# Patient Record
Sex: MALE | Race: ASIAN | NOT HISPANIC OR LATINO
[De-identification: names, ages, dates, MRNs, and addresses within clinical notes are randomized per-mention and may not be internally consistent; named-entity substitution may affect disease eponyms.]

---

## 2017-05-01 ENCOUNTER — RECORDS - HEALTHEAST (OUTPATIENT)
Dept: ADMINISTRATIVE | Facility: OTHER | Age: 62
End: 2017-05-01

## 2017-05-10 ENCOUNTER — OFFICE VISIT - HEALTHEAST (OUTPATIENT)
Dept: FAMILY MEDICINE | Facility: CLINIC | Age: 62
End: 2017-05-10

## 2017-05-10 DIAGNOSIS — I63.9 CVA (CEREBRAL VASCULAR ACCIDENT) (H): ICD-10-CM

## 2017-05-10 DIAGNOSIS — R79.89 ELEVATED SERUM CREATININE: ICD-10-CM

## 2017-05-10 DIAGNOSIS — H02.401 PTOSIS, RIGHT: ICD-10-CM

## 2017-05-10 DIAGNOSIS — M10.9 GOUT: ICD-10-CM

## 2017-05-10 DIAGNOSIS — I69.351 HEMIPARESIS AFFECTING RIGHT SIDE AS LATE EFFECT OF CEREBROVASCULAR ACCIDENT (CVA) (H): ICD-10-CM

## 2017-05-10 DIAGNOSIS — I16.0 HYPERTENSIVE URGENCY: ICD-10-CM

## 2017-05-10 DIAGNOSIS — H53.8 BLURRY VISION: ICD-10-CM

## 2017-05-10 ASSESSMENT — MIFFLIN-ST. JEOR: SCORE: 1461.45

## 2017-05-11 RX ORDER — CARVEDILOL 6.25 MG/1
6.25 TABLET ORAL 2 TIMES DAILY
Qty: 60 TABLET | Refills: 1 | Status: SHIPPED | OUTPATIENT
Start: 2017-05-11

## 2017-05-11 RX ORDER — AMLODIPINE BESYLATE 10 MG/1
10 TABLET ORAL DAILY
Qty: 30 TABLET | Refills: 1 | Status: SHIPPED | OUTPATIENT
Start: 2017-05-11

## 2017-05-17 ENCOUNTER — OFFICE VISIT - HEALTHEAST (OUTPATIENT)
Dept: PHYSICAL THERAPY | Facility: CLINIC | Age: 62
End: 2017-05-17

## 2017-05-17 DIAGNOSIS — R29.898 RIGHT LEG WEAKNESS: ICD-10-CM

## 2017-05-17 DIAGNOSIS — R26.9 ABNORMALITY OF GAIT: ICD-10-CM

## 2017-05-17 DIAGNOSIS — I69.351 HEMIPARESIS AFFECTING RIGHT SIDE AS LATE EFFECT OF CEREBROVASCULAR ACCIDENT (CVA) (H): ICD-10-CM

## 2017-05-17 DIAGNOSIS — R29.818 DIFFICULTY BALANCING: ICD-10-CM

## 2017-05-17 DIAGNOSIS — R29.898 RIGHT ARM WEAKNESS: ICD-10-CM

## 2017-06-19 ENCOUNTER — RECORDS - HEALTHEAST (OUTPATIENT)
Dept: ADMINISTRATIVE | Facility: OTHER | Age: 62
End: 2017-06-19

## 2017-07-06 ENCOUNTER — RECORDS - HEALTHEAST (OUTPATIENT)
Dept: ADMINISTRATIVE | Facility: OTHER | Age: 62
End: 2017-07-06

## 2017-08-02 ENCOUNTER — OFFICE VISIT - HEALTHEAST (OUTPATIENT)
Dept: FAMILY MEDICINE | Facility: CLINIC | Age: 62
End: 2017-08-02

## 2017-08-02 DIAGNOSIS — Z01.818 PREOP EXAMINATION: ICD-10-CM

## 2017-08-02 DIAGNOSIS — I51.7 LVH (LEFT VENTRICULAR HYPERTROPHY): ICD-10-CM

## 2017-08-02 DIAGNOSIS — R94.31 ABNORMAL EKG: ICD-10-CM

## 2017-08-02 DIAGNOSIS — I16.0 HYPERTENSIVE URGENCY: ICD-10-CM

## 2017-08-02 DIAGNOSIS — Z91.199 MEDICALLY NONCOMPLIANT: ICD-10-CM

## 2017-08-02 DIAGNOSIS — I12.9 HYPERTENSIVE NEPHROSCLEROSIS: ICD-10-CM

## 2017-08-02 DIAGNOSIS — H02.409 PTOSIS: ICD-10-CM

## 2017-08-02 DIAGNOSIS — I69.351 HEMIPARESIS AFFECTING RIGHT SIDE AS LATE EFFECT OF CEREBROVASCULAR ACCIDENT (CVA) (H): ICD-10-CM

## 2017-08-02 ASSESSMENT — MIFFLIN-ST. JEOR: SCORE: 1501.59

## 2017-08-03 LAB
ATRIAL RATE - MUSE: 55 BPM
DIASTOLIC BLOOD PRESSURE - MUSE: NORMAL MMHG
INTERPRETATION ECG - MUSE: NORMAL
P AXIS - MUSE: 28 DEGREES
PR INTERVAL - MUSE: 194 MS
QRS DURATION - MUSE: 72 MS
QT - MUSE: 434 MS
QTC - MUSE: 415 MS
R AXIS - MUSE: -2 DEGREES
SYSTOLIC BLOOD PRESSURE - MUSE: NORMAL MMHG
T AXIS - MUSE: -23 DEGREES
VENTRICULAR RATE- MUSE: 55 BPM

## 2017-08-09 ENCOUNTER — OFFICE VISIT - HEALTHEAST (OUTPATIENT)
Dept: CARDIOLOGY | Facility: CLINIC | Age: 62
End: 2017-08-09

## 2017-08-09 DIAGNOSIS — R94.31 ABNORMAL ECG: ICD-10-CM

## 2017-08-09 DIAGNOSIS — I12.9 HYPERTENSIVE NEPHROSCLEROSIS: ICD-10-CM

## 2017-08-09 DIAGNOSIS — I16.0 HYPERTENSIVE URGENCY: ICD-10-CM

## 2017-08-09 ASSESSMENT — MIFFLIN-ST. JEOR: SCORE: 1521.55

## 2017-09-27 ENCOUNTER — RECORDS - HEALTHEAST (OUTPATIENT)
Dept: ADMINISTRATIVE | Facility: OTHER | Age: 62
End: 2017-09-27

## 2021-05-30 VITALS — BODY MASS INDEX: 34.17 KG/M2 | WEIGHT: 181 LBS | HEIGHT: 61 IN

## 2021-05-31 VITALS — WEIGHT: 177.6 LBS | HEIGHT: 64 IN | BODY MASS INDEX: 30.32 KG/M2

## 2021-05-31 VITALS — WEIGHT: 182 LBS | HEIGHT: 64 IN | BODY MASS INDEX: 31.07 KG/M2

## 2021-06-10 NOTE — PROGRESS NOTES
[facility] Certification Request    May 17, 2017      Patient: Zoltan Montoya  MR Number: [id number]  YOB: 1955  Date of Visit: 5/17/2017      Dear Dr. Mcbride    Thank you for this referral.   We are seeing Zoltan Montoya for Physical Therapy of Hemiparesis affecting right side as late effect of cerebrovascular accident (CVA).    Medicare and/or Medicaid requires physician review and approval of the treatment plan. Please review the plan of care and verify that you agree with the therapy plan of care by co-signing this note.      Plan of Care  Authorization / Certification Start Date: 05/17/17  Authorization / Certification End Date: 07/16/17  Authorization / Certification Number of Visits: MA  Communication with: Referral Source  Patient Related Instruction: Nature of Condition;Treatment plan and rationale;Self Care instruction;Basis of treatment;Next steps  Times per Week: 1-2  Number of Visits: up to 12  Discharge Planning: home program  Therapeutic Exercise: Stretching;Strengthening  Neuromuscular Reeducation: balance/proprioception  Manual Therapy: other  Manual Therapy: as needed    Goals:  Pt. will demonstrate/verbalize independence in self-management of condition in : 12 weeks  Pt. will be independent with home exercise program in : 12 weeks  Pt. will be able to walk : 60 minutes;with less difficulty;in 12 weeks;Comment  Comment:: wiht minimal fatigue  Patient will reach / maintain arm movement: forward;overhead;for dressing;for home chores;with less difficulty;in 12 weeks  Patient will be able to: gripping;holding;for housework;for meal preparation;for grooming/hygiene;with less difficulty;in 12 weeks      If you have any questions or concerns, please don't hesitate to call.    Sincerely,      Zuleyka Pugh, PT        Physician recommendation:     ___ Follow therapist's recommendation        ___ Modify therapy      *Physician co-signature indicates they certify the need for these  services furnished within this plan and while under their care.      [facility]   Initial Evaluation    Patient Name: Zoltan Montoya  Date of evaluation: 5/17/2017   Visit #1  Referral Diagnosis: Hemiparesis affecting right side as late effect of cerebrovascular accident (CVA)  Referring provider: Valerie Mcbride DO  Visit Diagnosis:     ICD-10-CM    1. Right arm weakness M62.81    2. Right leg weakness M62.81    3. Difficulty balancing R29.818    4. Abnormality of gait R26.9    5. Hemiparesis affecting right side as late effect of cerebrovascular accident (CVA) I69.351        Assessment:      Pt. is appropriate for skilled PT intervention as outlined in the Plan of Care (POC).  Pt. is a good candidate for skilled PT services to improve pain levels and function.    Goals:  Pt. will demonstrate/verbalize independence in self-management of condition in : 12 weeks  Pt. will be independent with home exercise program in : 12 weeks  Pt. will be able to walk : 60 minutes;with less difficulty;in 12 weeks;Comment  Comment:: wiht minimal fatigue  Patient will reach / maintain arm movement: forward;overhead;for dressing;for home chores;with less difficulty;in 12 weeks  Patient will be able to: gripping;holding;for housework;for meal preparation;for grooming/hygiene;with less difficulty;in 12 weeks    Patient's expectations/goals are realistic.    Barriers to Learning or Achieving Goals:  No Barriers.       Plan / Patient Instructions:        Plan of Care:   Authorization / Certification Start Date: 05/17/17  Authorization / Certification End Date: 07/16/17  Authorization / Certification Number of Visits: MA  Communication with: Referral Source  Patient Related Instruction: Nature of Condition;Treatment plan and rationale;Self Care instruction;Basis of treatment;Next steps  Times per Week: 1-2  Number of Visits: up to 12  Discharge Planning: home program  Therapeutic Exercise: Stretching;Strengthening  Neuromuscular  Reeducation: balance/proprioception  Manual Therapy: other  Manual Therapy: as needed    Plan for next visit: progression of home program, balance/gait training     Subjective:         Social information:   Living Situation:lives with others    Occupation:retired   Work Status:NA   Equipment Available: None    History of Present Illness:    Zoltan is a 61 y.o. male who presents to therapy today with complaints of (R) sided stiffness, weakness in arm and leg. Denies any pain complaints. Hx of CVA in . Recent episode of sx including (R) sided stiffness related to HTN. Date of onset/duration of symptoms is . Onset was sudden. Symptoms are constant. He reports  A previous  history of similar symptoms. He describes their previous level of function as not limited    Pain Ratin}  Pain rating at best: 1  Pain rating at worst: 3  Pain description: weakness and stiffness    Functional limitations are described as occurring with:   ascending and descending stairs or curbs  balance  gripping and holding  lifting  personal cares dressing  performing routine daily activities  sitting 20 min   standing 30 min  walking 30 min  lifting   writing    Patient reports benefit from:  movement or exercise        Objective:      Patient Outcome Measures :    Lower Extremity Functional Scale (_/80): 26   Scores range from 0-80, where a score of 80 represents maximum function. The minimal clinically important difference is a positive change of 9 points.    Examination  1. Right arm weakness     2. Right leg weakness     3. Difficulty balancing     4. Abnormality of gait     5. Hemiparesis affecting right side as late effect of cerebrovascular accident (CVA)       Involved side: Right  Posture Observation: NA    Gait: (L) lat trunk sway, higher knee lift (R) with decreased (R) ankle ROM, asymmetrical arm swing, normal umair, symmetrical step length.   SLS: 5 sec of less (B), expresses fear of falling.     (B) shoulder AROM is WNL  in flexion, abd, IR/ext    Strength  Date: 5/17/17     /5 Right Left Right Left Right Left   Cervical Flexion (C1-2)         Cervical Sidebending (C3)         Shoulder flexion 5 5       Shoulder Abduction (C5) 5 5       Elbow Flexion (C6) 5 5       Elbow Extension (C7) 5 5       Wrist Flexion (C7) 5 5       Wrist Extension (C6)         Thumb abduction (C8)          strength  35/40/45# 55/55/50#         (B) hip, knee and ankle ROM is WFL except for mild hip rotational tightness.     Lower Extremity Strength:  Date: 5/17/17     LE strength/5 Right Left Right Left Right Left   Hip Flexion (L1-3) 4 4+       Hip Extension (L5-S1)         Hip Abduction (L4-5)         Hip Adduction (L2-3)         Hip External Rotation         Hip Internal Rotation         Knee Extension (L3-4) 4+ 4+       Knee Flexion 5- 5-       Ankle Dorsiflexion (L4-5) 5- 5       Great Toe Extension (L5)         Ankle Plantar flexion (S1) 3+ 3+       Abdominals            Treatment Today   Paper work 20 min  TREATMENT MINUTES COMMENTS   Evaluation 25    Self-care/ Home management     Manual therapy     Neuromuscular Re-education     Therapeutic Activity     Therapeutic Exercises 20 Discussed findings, plan of care, instructed in exercises.Issued handouts and band.    Gait training     Modality__________________                Total 45    Blank areas are intentional and mean the treatment did not include these items.     PT Evaluation Code: (Please list factors)  Patient History/Comorbidities: 2  Examination: 4  Clinical Presentation: evolving  Clinical Decision Making: mod    Patient History/  Comorbidities Examination  (body structures and functions, activity limitations, and/or participation restrictions) Clinical Presentation Clinical Decision Making (Complexity)   No documented Comorbidities or personal factors 1-2 Elements Stable and/or uncomplicated Low   1-2 documented comorbidities or personal factor 3 Elements Evolving clinical presentation  with changing characteristics Moderate   3-4 documented comorbidities or personal factors 4 or more Unstable and unpredictable High              Zuleyka Pugh  5/17/2017  9:10 AM    [facility] Discharge Summary  Patient Name: Zoltan Montoya  Date: 6/21/2017  Referral Diagnosis: Hemiparesis affecting right side as late effect of cerebrovascular accident (CVA)  Referring provider: Valerie Mcbride DO  Visit Diagnosis:   1. Right arm weakness     2. Right leg weakness     3. Difficulty balancing     4. Abnormality of gait     5. Hemiparesis affecting right side as late effect of cerebrovascular accident (CVA)         Goals:  Pt. will demonstrate/verbalize independence in self-management of condition in : 12 weeks  Pt. will be independent with home exercise program in : 12 weeks  Pt. will be able to walk : 60 minutes;with less difficulty;in 12 weeks;Comment  Comment:: wiht minimal fatigue  Patient will reach / maintain arm movement: forward;overhead;for dressing;for home chores;with less difficulty;in 12 weeks  Patient will be able to: gripping;holding;for housework;for meal preparation;for grooming/hygiene;with less difficulty;in 12 weeks    Patient was seen for 1 visit on 5/17/17 with no missed appointments.  Patient did not schedule follow up appointments.  He was instructed in a a home program.  His current status is not known.     Therapy will be discontinued at this time.  The patient will need a new referral to resume.    Thank you for your referral.  Zuleyka Pugh  6/21/2017  9:04 AM

## 2021-06-10 NOTE — PROGRESS NOTES
Assessment/Plan:     1. Hypertensive urgency  Currently well controlled plan to continue medications.  They will continue to monitor blood pressure at home.    2. Elevated serum creatinine  Patient does have follow-up with nephrologist will get lab recheck  - Basic Metabolic Panel    3. CVA (cerebral vascular accident)  4. Hemiparesis affecting right side as late effect of cerebrovascular accident (CVA)  Currently off of anticoagulants as recommended in the hospital due to subacute hemorrhage.  May need to consider anticoagulants in the future.  Patient's right-sided weakness is reported at the chronic state.  Occupational therapy per his request.  Discussed the importance of staying on medication to control risk factors to prevent further strokes.  Patient and daughter-in-law are and agree movement continuing medications and medical care at this time  - Ambulatory referral to PT/OT    5. Blurry vision  Chronic will refer to ophthalmology  - Ambulatory referral to Ophthalmology    6. Ptosis, right  Referral as below  - Ambulatory referral to Ophthalmology    7. Gout  Seems resolved and patient's left cyst prednisone tap was today.  Suspect that some of the weight gain that they are concerned about will decrease with the cessation of prednisone but they will continue to monitor and let us know if symptoms are not improving in the next week    Plan to follow-up with Dr. Gill within the next few weeks to establish care with him per patient's preference.    Subjective:      Zoltan Montoya is a 61 y.o. male comes in today with  and daughter-in-law to establish care at our office.  He does plan to establish care with Dr. Gill as he prefers male provider.  He recently moved here to be with his family from [place].  He ended up in the hospital at [facility] just a few days after arrival in [place] due to complaints of right-sided weakness and blurry vision.  He was found to be in hypertensive urgency.   Reviewed the hospitalization from May 3 until May 5.  Reviewed discharge summary labs imaging that were done.  Reviewed the medical tests including EKG and echo that were done.  She was found to have a subacute hemorrhage and infarct on MRI.  His blood pressure was able to be regulated with medications.  He tells me that his right-sided weakness and blurry vision has not improved since hospitalization but that it is in fact chronic.  She reported that he had a stroke in 2015 and these are late effects from that stroke.  He states that he would like to try therapy to see if his weakness can improve.  He has no pain.  During hospitalization he was also found to have a living and elevated creatinine.  It did improve somewhat over the course of hospitalization.  He does have follow-up in a few weeks with nephrology.  He was also found to have gout in his right toe was started on prednisone took last pill today and has improved.  We performed medication reconciliation he was taking all medications as prescribed tolerates them well.  They have been checking his blood pressure and pulse.  Pulse has been in the low 60s blood pressure has been very well controlled.  They are complaining of some weight gain almost 10 pounds since discharge.  He has no chest pain pressure shortness of breath or palpitations.  His echocardiogram was normal.  No concerns with bowel or bladder.  We did try to update past medical surgical social family history but overall patient's history is somewhat lacking.  It is reported that he had a stroke in 2015 but states he has not recently seen medical care.  He does not know the reason for his stroke.  He was taking no medications prior to coming to [place].  He states he is not known to have any kidney damage but again has not been checked for several years.  No other new concerns today    Current Outpatient Prescriptions   Medication Sig Dispense Refill     amLODIPine (NORVASC) 10 MG tablet Take 1  "tablet (10 mg total) by mouth daily. 30 tablet 0     carvedilol (COREG) 6.25 MG tablet Take 1 tablet (6.25 mg total) by mouth 2 (two) times a day. 60 tablet 0     No current facility-administered medications for this visit.        Past Medical History, Family History, and Social History reviewed.  Past Medical History:   Diagnosis Date     HTN (hypertension)      Stroke 2015     Past Surgical History:   Procedure Laterality Date     APPENDECTOMY       Review of patient's allergies indicates no known allergies.  History reviewed. No pertinent family history.  Social History     Social History     Marital status:      Spouse name: N/A     Number of children: N/A     Years of education: N/A     Occupational History     Not on file.     Social History Main Topics     Smoking status: Never Smoker     Smokeless tobacco: Not on file     Alcohol use No     Drug use: No     Sexual activity: Not on file     Other Topics Concern     Not on file     Social History Narrative         Review of systems is as stated in HPI, and the remainder of the 10 system review is otherwise negative.    Objective:     Vitals:    05/10/17 1440   BP: 138/78   Patient Site: Right Arm   Patient Position: Sitting   Cuff Size: Adult Large   Pulse: 60   Weight: 181 lb (82.1 kg)   Height: 5' 0.5\" (1.537 m)    Body mass index is 34.77 kg/(m^2).    General Appearance:    Alert, cooperative, no distress, appears stated age   Head:    Normocephalic, without obvious abnormality, atraumatic   Eyes:   there is left lid ptosis, PERRL, EOM's intact   Ears:    Normal TM's and external ear canals   Nose:   Mucosa normal, no drainage     or sinus tenderness   Throat:   Oropharynx is clear   Neck:   Supple, symmetrical, no adenopathy, no thyromegally, no carotid bruit        Lungs:     Clear to auscultation bilaterally, respirations unlabored   Chest Wall:    No tenderness or deformity    Heart:    Regular rate and rhythm, S1 and S2 normal, no murmur, rub   "  or gallop       Abdomen:     Soft, non-tender, bowel sounds active all four quadrants,     no masses, no organomegaly           Extremities      Neuro:  very mild decrease in strength on right extremity compared to left otherwise extremities normal, atraumatic, no cyanosis, trace edema to mid shin, ambulates normally   Normal sensation and reflexes in extremities.  Cranial nerves grossly intact.  No dysdiadochokinesia    Pulses:   2+ and symmetric all extremities   Skin:   No rashes or lesions         This note has been dictated using voice recognition software. Any grammatical or context distortions are unintentional and inherent to the the software.

## 2021-06-12 NOTE — PROGRESS NOTES
Assessment/Plan:     1. Preop examination  2. Ptosis  3. Hypertensive urgency  4. Hemiparesis affecting right side as late effect of cerebrovascular accident (CVA)  5. Hypertensive nephrosclerosis  6. LVH (left ventricular hypertrophy)  7. Abnormal EKG-I personally reviewed EKG showing some bradycardia with some worrisome ST elevations compared to previous.  Pending radiologist's final interpretation  8. Medically noncompliant  I spent significant time discussing with patient through the  and also with the daughter-in-law the short and long-term consequences of his uncontrolled hypertension.  I believe him to be at very strong risk for both stroke and heart attack especially due to new EKG changes.  Did discuss with patient that further strokes and heart disease could leave him with further disability difficulty using his extremities, difficulty with speech or swallowing memory or even death.  Patient states at this point he is not willing to restart his medication.  He does not want to get a second opinion by a neurologist.  He is willing to get some workup with his heart but is not willing to go to emergency room or hospital today.  Currently asymptomatic in office.  We will add referral to cardiology rapid access clinic and discussed with patient and family that if he has any symptoms such as chest pain pressure palpitations difficulty breathing dizziness swelling in extremities or any other symptoms he should urgently be seen at the emergency room.  Again strongly advised that patient restart medications.  Even gave option to change medications to something else he may be more comfortable with he declines.  I do believe that patient does understand the gravity of the situation.  Also daughter-in-law does understand the gravity of the situation but states that family has decided that it will be patient's choice.  They continue to urge him to be medically compliant.  At this point I cannot recommend  elective surgery.  Patient states he is agreeable and states he does not feel he necessarily needs his eyelid surgery at this point.  We will notify the surgeon's office.  - Ambulatory referral to [facility]  -Referral for stress test          Total time spent was 45 minutes, >50% spent discussing treatment options noted above, counseling and coordination of care.     Subjective:      Zoltan Montoya is a 61 y.o. male comes in today initially for preoperative evaluation.  He is seen today with .  Daughter-in-law Aide is also here.    Scheduled Procedure: left eye surgery   Surgery Date:  8/18/17  Surgery Location:  [facility] [place] fax-  [phone]  Surgeon: Dr Pickett    Patient plans for ptosis surgery left eyelid.  This is reportedly from past stroke.  Last time I saw him a few months ago was just after hypertensive urgency and stroke.  He was doing well on his medications.  Since we last saw him he has stopped both his carvedilol and amlodipine.  He states he did not particularly have any problems or reactions or concerns with these medications he just does not want to take medications.  I was also able to get the records from nephrology who did see him and also strongly suggested continuing medications due to some kidney injury.  Patient states that overall he feels well he has no chest pain palpitations diaphoresis or shortness of breath.  He has no swelling in extremities.  He states he does still have some weakness in his arm went to physical therapy once may consider going back.  Reviewed the workup that was done in the hospital including echocardiogram showing some left ventricular hypertrophy but was otherwise normal.  Daughter-in-law states that she has been checking his blood pressure and it is typically quite elevated around 250 or so.  Daughter-in-law states that they have been urging him to take his medications but he is refusing.  Did have a very lengthy discussion with patient  "through  and also with daughter-in-law discussing both short and long-term consequences of his uncontrolled hypertension.    Current Outpatient Prescriptions   Medication Sig Dispense Refill     amLODIPine (NORVASC) 10 MG tablet Take 1 tablet (10 mg total) by mouth daily. 30 tablet 1     carvedilol (COREG) 6.25 MG tablet Take 1 tablet (6.25 mg total) by mouth 2 (two) times a day. 60 tablet 1     No current facility-administered medications for this visit.      No Known Allergies  Past Medical History, Family History, and Social History reviewed.  Past Medical History:   Diagnosis Date     HTN (hypertension)      Stroke 2015     Past Surgical History:   Procedure Laterality Date     APPENDECTOMY       Review of patient's allergies indicates no known allergies.  No family history on file.  Social History     Social History     Marital status:      Spouse name: N/A     Number of children: N/A     Years of education: N/A     Occupational History     Not on file.     Social History Main Topics     Smoking status: Never Smoker     Smokeless tobacco: Not on file     Alcohol use No     Drug use: No     Sexual activity: Not on file     Other Topics Concern     Not on file     Social History Narrative         Review of systems is as stated in HPI, and the remainder of the 10 system review is otherwise negative.    Objective:     Vitals:    08/02/17 0850   BP: (!) 240/100   Patient Site: Right Arm   Patient Position: Sitting   Cuff Size: Adult Large   Pulse: 66   SpO2: 98%   Weight: 177 lb 9.6 oz (80.6 kg)   Height: 5' 4\" (1.626 m)    Body mass index is 30.48 kg/(m^2).  Wt Readings from Last 3 Encounters:   08/02/17 177 lb 9.6 oz (80.6 kg)   05/10/17 181 lb (82.1 kg)   05/05/17 172 lb 1.6 oz (78.1 kg)       General Appearance:    Alert, cooperative, no distress, appears stated age    Head:    Normocephalic, without obvious abnormality, atraumatic   Eyes:    PERRL,  no conjunctivitis    Ears:    Normal " external ear canals   Nose:   Mucosa normal, no drainage       Throat:   Oropharynx is clear   Neck:   Supple, symmetrical, no adenopathy, no thyromegally, no carotid bruit        Lungs:     Clear to auscultation bilaterally, respirations unlabored   Chest Wall:    No tenderness or deformity    Heart:    Regular rate and rhythm, S1 and S2 normal, murmur, no rub    or gallop                   Extremities:  Chronic upper extremity weakness from past CVA otherwise extremities normal, atraumatic, no cyanosis or edema   Pulses:   2+ and symmetric all extremities   Neuro:   Minor left eyelid droop due to past CVA otherwise cranial nerves grossly intact, grossly normal sensation and reflexes in extremities    Psych:   grossly normal mood and affect without acute anxiety or psychosis    Skin:   No rashes or lesions   :          This note has been dictated using voice recognition software. Any grammatical or context distortions are unintentional and inherent to the software.

## 2021-06-15 PROBLEM — H02.401 PTOSIS, RIGHT: Status: ACTIVE | Noted: 2017-05-10

## 2021-06-15 PROBLEM — I16.0 HYPERTENSIVE URGENCY: Status: ACTIVE | Noted: 2017-05-03

## 2021-06-15 PROBLEM — I69.351 HEMIPARESIS AFFECTING RIGHT SIDE AS LATE EFFECT OF CEREBROVASCULAR ACCIDENT (CVA) (H): Status: ACTIVE | Noted: 2017-05-10

## 2021-06-15 PROBLEM — I63.9 CVA (CEREBRAL VASCULAR ACCIDENT) (H): Status: ACTIVE | Noted: 2017-05-10

## 2021-06-15 PROBLEM — M10.9 GOUT: Status: ACTIVE | Noted: 2017-05-10

## 2021-06-16 PROBLEM — I12.9 HYPERTENSIVE NEPHROSCLEROSIS: Status: ACTIVE | Noted: 2017-08-02

## 2021-06-25 NOTE — PROGRESS NOTES
Progress Notes by Cari Rodney MD at 8/9/2017  3:50 PM     Author: Cari Rodney MD Service: -- Author Type: Physician    Filed: 8/9/2017  5:18 PM Encounter Date: 8/9/2017 Status: Signed    : Cari Rodney MD (Physician)           Click to link to [facility] HEART CARE NOTE    Thank you, Dr. Mcbride, for asking the [facility] team to see Mr. Zoltan Montoya in the rapid access clinic to evaluate abnormal ECG.    Assessment/Recommendations   Assessment:    1.  Abnormal ECG.  I suspect this reflects left ventricular hypertrophy with mild early repolarization changes.  Because of his risk factors, he has been set up for a nuclear stress study this coming Monday which I concur with.  Unfortunately, the patient tells me that he was unaware of the stress study and may not show up for it.  I did strongly encourage him to follow through with the stress test.  2.  Hypertensive urgency.  Patient discontinued medications that were prescribed at the time of his hospitalization 1-2 months ago, telling me that he is concerned they will cause further harm to his kidneys.  I told him he was seen by a kidney specialist during that hospitalization who recommended these medications because they would not adversely affect his kidney function.  He continues to not want to resume those medications and I explained to him that he could develop further kidney failure as well as subsequent stroke or heart failure.  I also told him they would make him more disabled.  Despite all this, it appears that the patient is uninterested in taking care of himself.  This is confirmed by his son who was present today.    3.  Stage IV chronic kidney disease, likely related to uncontrolled hypertension  4.  Medical noncompliance    Plan:  1.  Strongly encourage the patient to resume his previous blood pressure medications and follow recommendations made by his physicians  2.  Encourage the patient to pursue stress  test which was ordered for next Monday by his primary physician.       History of Present Illness    Mr. Zoltan Montoya is a 61 y.o. male with long-standing history of uncontrolled hypertension, stage IV chronic kidney disease, status post left CVA who was recently seen by his primary physician for preoperative examination.  An ECG was obtained demonstrating sinus bradycardia and mild ST elevation in the anterior precordial leads.  He did admit at that visit that he has stopped taking his antihypertensive medications and refused to resume them.  Because of the concerns on the ECG, he was agreeable to further workup and a stress test was ordered for next Monday.  He is now seen in the rapid access clinic today for any further recommendations.    Again at the time of his visit, he is refusing to resume taking the blood pressure medications as he is concerned this will cause more harm to his kidneys.  I told him he was seen by a kidney specialist during his recent hospitalization who chose these medications as they would not cause further harm to his kidney function.  I also spoke at length regarding further complications associated with uncontrolled hypertension and the likelihood he will become further disabled.  I also told him he was scheduled for a stress test next Monday which he states he was unaware of.  Uncertain whether he is going to follow through with that appointment.  He currently denies any complaints of chest pain, shortness of breath, headache, lightheadedness or blurred vision.    ECG (personally reviewed): ECG demonstrated sinus bradycardia, left ventricular hypertrophy by voltage criteria and ST elevation likely consistent with early repolarization.    ECHO (personally reviewed): Most recent echocardiogram demonstrated left ventricular hypertrophy and normal systolic performance.  Mild aortic insufficiency noted.     Physical Examination Review of Systems   Vitals:    08/09/17 1551   BP: (!) 180/110    Pulse: 68   Resp: 18     Body mass index is 31.24 kg/(m^2).  Wt Readings from Last 3 Encounters:   08/09/17 182 lb (82.6 kg)   08/02/17 177 lb 9.6 oz (80.6 kg)   05/10/17 181 lb (82.1 kg)     General Appearance:   Awake, Alert, No acute distress.   HEENT:  No scleral icterus; the mucous membranes were pink and moist.   Neck: No cervical bruits or jugular venous distention    Chest: The spine was straight. The chest was symmetric.   Lungs:   Respirations unlabored; the lungs are clear to auscultation. No wheezing   Cardiovascular:    Regular rate and rhythm.  S1, S2 normal.  No murmur, gallop or rub   Abdomen:  No organomegaly, masses, bruits, or tenderness. Bowels sounds are present   Extremities:  No peripheral edema bilaterally.   Skin: No xanthelasma. Warm, Dry.   Musculoskeletal: No tenderness.   Neurologic: Mood and affect are appropriate.    General: WNL  Eyes: WNL  Ears/Nose/Throat: WNL  Lungs: WNL  Heart: WNL  Stomach: WNL  Bladder: WNL  Muscle/Joints: WNL  Skin: WNL  Nervous System: WNL  Mental Health: WNL     Blood: WNL     Medical History  Surgical History Family History Social History   Past Medical History:   Diagnosis Date   ? HTN (hypertension)    ? Stroke 2015    Past Surgical History:   Procedure Laterality Date   ? APPENDECTOMY      No family history on file. Social History     Social History   ? Marital status:      Spouse name: N/A   ? Number of children: N/A   ? Years of education: N/A     Occupational History   ? Not on file.     Social History Main Topics   ? Smoking status: Never Smoker   ? Smokeless tobacco: Not on file   ? Alcohol use No   ? Drug use: No   ? Sexual activity: Not on file     Other Topics Concern   ? Not on file     Social History Narrative          Medications  Allergies   Current Outpatient Prescriptions   Medication Sig Dispense Refill   ? amLODIPine (NORVASC) 10 MG tablet Take 1 tablet (10 mg total) by mouth daily. 30 tablet 1   ? carvedilol (COREG) 6.25 MG  tablet Take 1 tablet (6.25 mg total) by mouth 2 (two) times a day. 60 tablet 1     No current facility-administered medications for this visit.       No Known Allergies      Lab Results    Chemistry/lipid CBC Cardiac Enzymes/BNP/TSH/INR   Lab Results   Component Value Date    CHOL 143 05/04/2017    HDL 42 05/04/2017    LDLCALC 84 05/04/2017    TRIG 84 05/04/2017    CREATININE 3.84 (H) 05/10/2017    BUN 82 (H) 05/10/2017    K 4.5 05/10/2017     05/10/2017     (H) 05/10/2017    CO2 19 (L) 05/10/2017    Lab Results   Component Value Date    WBC 8.2 05/05/2017    HGB 12.5 (L) 05/05/2017    HCT 38.2 (L) 05/05/2017    MCV 93 05/05/2017     05/05/2017    Lab Results   Component Value Date    TROPONINI 0.04 05/03/2017    TSH 1.39 05/04/2017